# Patient Record
Sex: FEMALE | Race: WHITE | Employment: FULL TIME | URBAN - METROPOLITAN AREA
[De-identification: names, ages, dates, MRNs, and addresses within clinical notes are randomized per-mention and may not be internally consistent; named-entity substitution may affect disease eponyms.]

---

## 2018-10-20 ENCOUNTER — CLINICAL SUPPORT (OUTPATIENT)
Dept: PRIMARY CARE CLINIC | Age: 33
End: 2018-10-20

## 2018-10-20 DIAGNOSIS — Z00.8 ENCOUNTER FOR BIOMETRIC SCREENING: Primary | ICD-10-CM

## 2018-10-23 ENCOUNTER — TELEPHONE (OUTPATIENT)
Dept: PRIMARY CARE CLINIC | Age: 33
End: 2018-10-23

## 2018-10-30 ENCOUNTER — TELEPHONE (OUTPATIENT)
Dept: PRIMARY CARE CLINIC | Age: 33
End: 2018-10-30

## 2018-10-30 NOTE — TELEPHONE ENCOUNTER
2nd attempt at contacting patient in regards to screening results  Unsuccessful, advised per voicemail will mail biometric lab results t, no further action needed.

## 2020-10-13 ENCOUNTER — OFFICE VISIT (OUTPATIENT)
Dept: SURGERY | Age: 35
End: 2020-10-13
Payer: COMMERCIAL

## 2020-10-13 VITALS
HEIGHT: 64 IN | BODY MASS INDEX: 24.41 KG/M2 | RESPIRATION RATE: 18 BRPM | DIASTOLIC BLOOD PRESSURE: 65 MMHG | TEMPERATURE: 96.4 F | WEIGHT: 143 LBS | SYSTOLIC BLOOD PRESSURE: 120 MMHG | HEART RATE: 56 BPM | OXYGEN SATURATION: 98 %

## 2020-10-13 DIAGNOSIS — R22.2 MASS OF SUBCUTANEOUS TISSUE OF BACK: ICD-10-CM

## 2020-10-13 DIAGNOSIS — R22.2 MASS OF SUBCUTANEOUS TISSUE OF BACK: Primary | ICD-10-CM

## 2020-10-13 PROCEDURE — 99203 OFFICE O/P NEW LOW 30 MIN: CPT | Performed by: SURGERY

## 2020-10-13 NOTE — LETTER
10/13/20 Patient: Vianey Anderson YOB: 1985 Date of Visit: 10/13/2020 Sylvia Sauer NP 
[address] [address] VIA Facsimile: [phone] Dear Sylvia Sauer NP, Thank you for referring Ms. Ester Da Silva to  [facility]  for evaluation. My notes for this consultation are attached. If you have questions, please do not hesitate to call me. I look forward to following your patient along with you.  
 
 
Sincerely, 
 
Sheldon Iglesias MD

## 2020-10-13 NOTE — PROGRESS NOTES
HISTORY OF PRESENT ILLNESS  Jocelyn Rajan is a 37 y.o. female who comes in for consultation by Nikki Zaman NP for a left upper back mass  HPI  She has been having discomfort over her left upper back. She is aggressive with crossfit training and does a lot of lifting at work and was getting a massage and her therapist noted a lump between the scapula and the back. She denies trauma/skin changes, drainage. She saw Nikki Zaman NP who referred her here. She also reports headaches. Past Medical History:   Diagnosis Date    History of ulcerative colitis     Ulcerative colitis ([facility] [address]      Past Surgical History:   Procedure Laterality Date    HX WISDOM TEETH EXTRACTION Bilateral      Family History   Problem Relation Age of Onset    No Known Problems Mother     No Known Problems Father     No Known Problems Sister     No Known Problems Brother     No Known Problems Maternal Aunt     No Known Problems Maternal Uncle     No Known Problems Paternal Aunt     No Known Problems Paternal Uncle     No Known Problems Maternal Grandmother     No Known Problems Maternal Grandfather     Cancer Paternal Grandmother     Diabetes Paternal Grandfather     No Known Problems Other      Social History     Tobacco Use    Smoking status: Never Smoker    Smokeless tobacco: Never Used   Substance Use Topics    Alcohol use: Yes     Comment: occasionally    Drug use: Never     Current Outpatient Medications   Medication Sig    RECLIPSEN, 28, 0.15-0.03 mg per tablet     mercaptopurine (PURINETHOL) 50 mg tablet     ASACOL  mg DR tablet     guaiFENesin-codeine (GUAIATUSSIN AC)  mg/5 mL solution Take 5 mL by mouth nightly as needed for Cough. Max Daily Amount: 5 mL. No current facility-administered medications for this visit. No Known Allergies    Review of Systems   Constitutional: Negative for chills, diaphoresis, fever and weight loss. HENT: Negative for sore throat.     Eyes: Negative for blurred vision and discharge. Respiratory: Negative for cough, shortness of breath and wheezing. Cardiovascular: Negative for chest pain, palpitations, orthopnea, claudication and leg swelling. Gastrointestinal: Negative for abdominal pain, constipation, diarrhea, heartburn, melena, nausea and vomiting. Genitourinary: Negative for dysuria, flank pain, frequency and hematuria. Musculoskeletal: Positive for back pain and myalgias. Negative for joint pain and neck pain. Skin: Negative for rash. Neurological: Positive for headaches. Negative for dizziness, speech change, focal weakness, seizures, loss of consciousness and weakness. Endo/Heme/Allergies: Does not bruise/bleed easily. Psychiatric/Behavioral: Negative for depression and memory loss. Visit Vitals  /65 (BP 1 Location: Left arm, BP Patient Position: Sitting)   Pulse (!) 56   Temp (!) 96.4 °F (35.8 °C) (Temporal)   Resp 18   Ht 5' 4\" (1.626 m)   Wt 64.9 kg (143 lb)   SpO2 98%   BMI 24.55 kg/m²       Physical Exam  Constitutional:       General: She is not in acute distress. Appearance: She is well-developed. She is not diaphoretic. HENT:      Head: Normocephalic and atraumatic. Mouth/Throat:      Pharynx: No oropharyngeal exudate. Eyes:      General: No scleral icterus. Conjunctiva/sclera: Conjunctivae normal.      Pupils: Pupils are equal, round, and reactive to light. Neck:      Musculoskeletal: Normal range of motion and neck supple. Thyroid: No thyromegaly. Vascular: No JVD. Trachea: No tracheal deviation. Cardiovascular:      Rate and Rhythm: Normal rate and regular rhythm. Heart sounds: No murmur. No friction rub. No gallop. Pulmonary:      Effort: Pulmonary effort is normal. No respiratory distress. Breath sounds: Normal breath sounds. No wheezing or rales. Abdominal:      General: Bowel sounds are normal. There is no distension. Palpations: Abdomen is soft. There is no mass. Tenderness: There is no abdominal tenderness. There is no guarding or rebound. Musculoskeletal: Normal range of motion. Comments: Left upper back with slight tenderness at medial inferior edge under the scapula but I do not feel a mass    Small 3 mm epidermal cyst central back   Lymphadenopathy:      Cervical: No cervical adenopathy. Skin:     General: Skin is warm and dry. Coloration: Skin is not pale. Findings: No erythema or rash. Neurological:      Mental Status: She is alert and oriented to person, place, and time. Cranial Nerves: No cranial nerve deficit. Psychiatric:         Behavior: Behavior normal.         Thought Content: Thought content normal.         Judgment: Judgment normal.         ASSESSMENT and PLAN  1. Left upper back pain and possible subcutaneous mass, although not palpable on my exam today. I explained to her about the anatomy and pathophysiology of the process and possibility of a lipoma or other vs just muscle/tendonous tissue. It is very bothersome to her and she is very in tune with her body. We discussed observation vs further work up. 2.  Ulcerative colitis. Controlled on rx    Will check US of the area to look for a mass.   If negative will consider MRI    Will call with US results      Shalonda Mccracken MD FACS

## 2020-10-13 NOTE — PROGRESS NOTES
Chief Complaint   Patient presents with    Cyst     seen as requested by Levi Pritchett for cyst Left Shoulder     1. Have you been to the ER, urgent care clinic since your last visit? Hospitalized since your last visit? No    2. Have you seen or consulted any other health care providers outside of the [address] [place] [address] since your last visit? Include any pap smears or colon screening.  No

## 2020-10-15 ENCOUNTER — TELEPHONE (OUTPATIENT)
Dept: SURGERY | Age: 35
End: 2020-10-15

## 2020-10-15 ENCOUNTER — HOSPITAL ENCOUNTER (OUTPATIENT)
Dept: ULTRASOUND IMAGING | Age: 35
Discharge: HOME | End: 2020-10-15
Attending: SURGERY
Payer: COMMERCIAL

## 2020-10-15 PROCEDURE — 76604 US EXAM CHEST: CPT

## 2020-10-20 ENCOUNTER — OFFICE VISIT (OUTPATIENT)
Dept: SURGERY | Age: 35
End: 2020-10-20
Payer: COMMERCIAL

## 2020-10-20 VITALS
DIASTOLIC BLOOD PRESSURE: 68 MMHG | SYSTOLIC BLOOD PRESSURE: 108 MMHG | OXYGEN SATURATION: 98 % | HEART RATE: 57 BPM | RESPIRATION RATE: 16 BRPM | TEMPERATURE: 97.8 F | WEIGHT: 139 LBS | HEIGHT: 64 IN | BODY MASS INDEX: 23.73 KG/M2

## 2020-10-20 DIAGNOSIS — M54.6 ACUTE LEFT-SIDED THORACIC BACK PAIN: Primary | ICD-10-CM

## 2020-10-20 PROCEDURE — 99213 OFFICE O/P EST LOW 20 MIN: CPT | Performed by: SURGERY

## 2020-10-20 NOTE — PROGRESS NOTES
Chief Complaint   Patient presents with    Follow-up     test results     1. Have you been to the ER, urgent care clinic since your last visit? Hospitalized since your last visit? No    2. Have you seen or consulted any other health care providers outside of the [address] [place] [address] since your last visit? Include any pap smears or colon screening.  No

## 2020-10-20 NOTE — LETTER
10/20/20 Patient: Argie Lennox YOB: 1985 Date of Visit: 10/20/2020 Chepe Adams NP 
[address] [facility] [address] VIA Facsimile: [phone] Dear Chepe Adams NP, Thank you for referring Ms. Dat Puente to [address] for evaluation. My notes for this consultation are attached. If you have questions, please do not hesitate to call me. I look forward to following your patient along with you.  
 
 
Sincerely, 
 
Marianne Silverman MD

## 2020-10-20 NOTE — PROGRESS NOTES
HISTORY OF PRESENT ILLNESS  Karyle Europe is a 37 y.o. female who comes in for follow up by Sue Parker NP for a left upper back mass/pain  HPI    She has been having discomfort over her left upper back for three weeks. She is aggressive with crossfit training and does a lot of lifting at work and was getting a massage and her therapist noted a lump between the scapula and the back. She denies trauma/skin changes, drainage. She saw Sue Parker NP who referred her here. She also reports headaches. An US of the region did not reveal anything abnormal.     Past Medical History:   Diagnosis Date    History of ulcerative colitis     Ulcerative colitis ([facility] [address]      Past Surgical History:   Procedure Laterality Date    HX WISDOM TEETH EXTRACTION Bilateral      Family History   Problem Relation Age of Onset    No Known Problems Mother     No Known Problems Father     No Known Problems Sister     No Known Problems Brother     No Known Problems Maternal Aunt     No Known Problems Maternal Uncle     No Known Problems Paternal Aunt     No Known Problems Paternal Uncle     No Known Problems Maternal Grandmother     No Known Problems Maternal Grandfather     Cancer Paternal Grandmother     Diabetes Paternal Grandfather     No Known Problems Other      Social History     Tobacco Use    Smoking status: Never Smoker    Smokeless tobacco: Never Used   Substance Use Topics    Alcohol use: Yes     Comment: occasionally    Drug use: Never     Current Outpatient Medications   Medication Sig    RECLIPSEN, 28, 0.15-0.03 mg per tablet     mercaptopurine (PURINETHOL) 50 mg tablet     ASACOL  mg DR tablet      No current facility-administered medications for this visit. No Known Allergies    Review of Systems   Constitutional: Negative for chills, diaphoresis, fever and weight loss. HENT: Negative for sore throat. Eyes: Negative for blurred vision and discharge.    Respiratory: Negative for cough, shortness of breath and wheezing. Cardiovascular: Negative for chest pain, palpitations, orthopnea, claudication and leg swelling. Gastrointestinal: Negative for abdominal pain, constipation, diarrhea, heartburn, melena, nausea and vomiting. Genitourinary: Negative for dysuria, flank pain, frequency and hematuria. Musculoskeletal: Positive for back pain and myalgias. Negative for joint pain and neck pain. Skin: Negative for rash. Neurological: Positive for headaches. Negative for dizziness, speech change, focal weakness, seizures, loss of consciousness and weakness. Endo/Heme/Allergies: Does not bruise/bleed easily. Psychiatric/Behavioral: Negative for depression and memory loss. Visit Vitals  /68 (BP 1 Location: Left arm, BP Patient Position: Sitting)   Pulse (!) 57   Temp 97.8 °F (36.6 °C) (Oral)   Resp 16   Ht 5' 4\" (1.626 m)   Wt 63 kg (139 lb)   LMP 10/06/2020 (Approximate)   SpO2 98%   BMI 23.86 kg/m²       Physical Exam  Constitutional:       General: She is not in acute distress. Appearance: She is well-developed. She is not diaphoretic. HENT:      Head: Normocephalic and atraumatic. Mouth/Throat:      Pharynx: No oropharyngeal exudate. Eyes:      General: No scleral icterus. Conjunctiva/sclera: Conjunctivae normal.      Pupils: Pupils are equal, round, and reactive to light. Neck:      Musculoskeletal: Normal range of motion and neck supple. Thyroid: No thyromegaly. Vascular: No JVD. Trachea: No tracheal deviation. Cardiovascular:      Rate and Rhythm: Normal rate and regular rhythm. Heart sounds: No murmur. No friction rub. No gallop. Pulmonary:      Effort: Pulmonary effort is normal. No respiratory distress. Breath sounds: Normal breath sounds. No wheezing or rales. Abdominal:      General: Bowel sounds are normal. There is no distension. Palpations: Abdomen is soft. There is no mass. Tenderness:  There is no abdominal tenderness. There is no guarding or rebound. Musculoskeletal: Normal range of motion. Comments: Left upper back with slight tenderness at medial inferior edge under the scapula but I do not feel a mass    Small 3 mm epidermal cyst central back   Lymphadenopathy:      Cervical: No cervical adenopathy. Skin:     General: Skin is warm and dry. Coloration: Skin is not pale. Findings: No erythema or rash. Neurological:      Mental Status: She is alert and oriented to person, place, and time. Cranial Nerves: No cranial nerve deficit. Psychiatric:         Behavior: Behavior normal.         Thought Content: Thought content normal.         Judgment: Judgment normal.         ASSESSMENT and PLAN  1. Left upper back pain and possible subcutaneous mass, although not palpable on my exam again today or on US. I explained to her about the anatomy and pathophysiology of the process and possibility of a lipoma or other vs just muscle/tendonous tissue. It is very bothersome to her and she is very in tune with her body. We discussed observation vs further work up. I feel that this is more of a musculoskeletal issue. Favor topical NSAIDs or lidodaine patches to the area  2. Ulcerative colitis.   Controlled on rx    We considered MRI but will hold off for now          Natalya Rios MD FACS

## 2023-05-19 RX ORDER — MERCAPTOPURINE 50 MG/1
TABLET ORAL
COMMUNITY
Start: 2015-11-09

## 2023-05-19 RX ORDER — MESALAMINE 800 MG/1
TABLET, DELAYED RELEASE ORAL
COMMUNITY
Start: 2015-11-09

## 2023-05-19 RX ORDER — DESOGESTREL AND ETHINYL ESTRADIOL 0.15-0.03
KIT ORAL
COMMUNITY
Start: 2015-11-22